# Patient Record
Sex: FEMALE | Race: WHITE | NOT HISPANIC OR LATINO | Employment: OTHER | URBAN - METROPOLITAN AREA
[De-identification: names, ages, dates, MRNs, and addresses within clinical notes are randomized per-mention and may not be internally consistent; named-entity substitution may affect disease eponyms.]

---

## 2018-01-15 NOTE — MISCELLANEOUS
Message   Recorded as Task   Date: 11/07/2016 10:36 AM, Created By: Shannon Reeves   Task Name: Miscellaneous   Assigned To: Shannon Reeves   Regarding Patient: Nadeen Villegas, Status: Active   CommentElmer Monahan - 07 Nov 2016 10:36 AM     TASK CREATED  Pt LMOM to cx appt for 11/23/16 as she is doing well! Andriy Nina - 07 Nov 2016 10:57 AM     TASK REPLIED TO: Previously Assigned To Andriy Nina  Provider aware  Thank you  Active Problems    1  Post herpetic neuralgia (053 19) (B02 29)   2  Thoracic neuralgia (729 2) (M79 2)    Current Meds   1  Colace Clear 50 MG Oral Capsule; TAKE AS DIRECTED; Therapy: [id number] to Recorded   2  Levothyroxine Sodium TABS; Therapy: (Recorded:14Hwj6539) to Recorded   3  Lyrica 50 MG Oral Capsule; 1 tab po tid; Therapy: [id number] to Recorded    Allergies    1  No Known Drug Allergies    Signatures   Electronically signed by :  Karoline Caba, ; Nov 7 2016 12:11PM EST                       (Author)

## 2018-01-15 NOTE — MISCELLANEOUS
Message   Recorded as Task   Date: 10/03/2016 11:41 AM, Created By: Cassia Villarreal   Task Name: Follow Up   Assigned To: [facility]   Regarding Patient: Chon Ramírez, Status: Active   Comment:    Latasha Carvajal - 03 Oct 2016 11:41 AM     TASK CREATED  Caller: Self; General Medical Question; [phone] (Home)  Pt presented at the window, s/w  staff  Requesting refill of lyrica to get to ov on 10/25  Latasha Carvajal - 03 Oct 2016 2:37 PM     TASK EDITED  LMOM to cb on home #   Wei,Latasha - 03 Oct 2016 3:29 PM     TASK EDITED  s/w pt  States she completed 1 week of 2 pills / day, and 1 week of 3 pills/ day  Pt states + relief, not completely gone, but feeling much better  No se's  Per pt, 42 tab on hand, next ov 10/25  Will need more medication to get to ov  Advised pt, will make Dr Breanne Talamantes aware and cb to advise tomorrow  Pt verbalized understanding and appreciation  [facility] [place] - 03 Oct 2016 9:19 PM     TASK REPLIED TO: Previously Assigned To Adelso Casper  please call in refill   Monse Robin - 05 Oct 2016 1:35 PM     TASK EDITED   Latasha Carvajal - 05 Oct 2016 1:50 PM     TASK EDITED   [facility] 10/5/2016 @ [phone]  Pt calling to fu to  of monday  Per SL, Lyrica 50 mg, 1 tab po tid #90/0 called into pharmacy per [employer]  Left a detaile dmessage on machine as per release of info on file advising of above  provided cb number for questions / concerns  Active Problems    1  Post herpetic neuralgia (053 19) (B02 29)   2  Thoracic neuralgia (729 2) (M79 2)    Current Meds   1  Levothyroxine Sodium TABS; Therapy: (Recorded:54Eoa4680) to Recorded   2  Tramadol-Acetaminophen 37 5-325 MG Oral Tablet; TAKE 1 TABLET 3 TIMES DAILY AS   NEEDED; Therapy: 22TGH1514 to (Evaluate:10Oct2016); Last Rx:20Sep2016 Ordered    Allergies    1   No Known Drug Allergies    Signatures   Electronically signed by : Riddhi Hoover, ; Oct  5 2016  1:50PM EST                       (Author)

## 2018-01-17 NOTE — MISCELLANEOUS
Message   Recorded as Task   Date: 09/27/2016 08:35 AM, Created By: [facility]   Task Name: Follow Up   Assigned To: [facility] [place] [facility]   Regarding Patient: Jose E Kim, Status: Active   Comment:    Latasha Carvajal - 27 Sep 2016 8:35 AM     TASK CREATED  Caller: Self; General Medical Question; [phone] (Home)  *** FYI ***  s/w pt, following up per Dr Sherry Bearden request  Pt states she has been taking lyrica 50 mg 1 tab po bid x 1 week w/ "Some" relief  Pt states, "I feel good " Denied se's  States she will begin Lyrica 50 mg 1 tab po tid today and fu w/ Dr Gracie Brown at her ov on 10/25  Advised pt to c/b w/ any questions / concern or if SE's present  Pt verbalized understanding and appreciation  Adelso Casper - 27 Sep 2016 9:22 AM     TASK REPLIED TO: Previously Assigned To [facility] [facility]  aware        Active Problems    1  Post herpetic neuralgia (053 19) (B02 29)   2  Thoracic neuralgia (729 2) (M79 2)    Current Meds   1  Levothyroxine Sodium TABS; Therapy: (Recorded:84Vhm1081) to Recorded   2  Tramadol-Acetaminophen 37 5-325 MG Oral Tablet; TAKE 1 TABLET 3 TIMES DAILY AS   NEEDED; Therapy: [id number] to (Evaluate:10Oct2016); Last Rx:[id number] Ordered    Allergies    1   No Known Drug Allergies    Signatures   Electronically signed by : Lindsey Barone, ; Sep 27 2016  9:24AM EST                       (Author)

## 2025-07-25 ENCOUNTER — OFFICE VISIT (OUTPATIENT)
Age: OVER 89
End: 2025-07-25
Payer: MEDICARE

## 2025-07-25 DIAGNOSIS — R26.81 GAIT INSTABILITY: ICD-10-CM

## 2025-07-25 DIAGNOSIS — S22.050D COMPRESSION FRACTURE OF T6 VERTEBRA WITH ROUTINE HEALING: ICD-10-CM

## 2025-07-25 DIAGNOSIS — H81.13 BENIGN PAROXYSMAL VERTIGO OF BOTH EARS: Primary | ICD-10-CM

## 2025-07-25 PROCEDURE — 97110 THERAPEUTIC EXERCISES: CPT

## 2025-07-25 PROCEDURE — 97112 NEUROMUSCULAR REEDUCATION: CPT

## 2025-07-25 NOTE — PROGRESS NOTES
Daily Note     Today's date: 2025  Patient name: Yina Bhardwaj  : 10/10/1932  MRN: [id number]  Referring provider: JAYCEE Zamorano  Encounter Diagnoses   Name Primary?    Benign paroxysmal vertigo of both ears Yes    Gait instability                   Subjective: Yina reports that she had a Dexa scan last week and was diagnosed with osteoporosis.  Would primarily like to work on her balance.       Objective: See treatment diary below      Assessment: Tolerated treatment well. Patient exhibited good technique with therapeutic exercises      Plan: Continue per plan of care.         Precautions: falls risk       Treatment Diary             Manual Therapy                                                                 Neuro Re-Ed             Airex EO/EC WBOS 30 sec             Airex EO/EC NBOS 30 sec            On airex head turns/nods 10 each             Firm surface staggered stance  30 sec             SLS 1 foot on aero level one 2x10 sec            Step over manjit forward/sideways 10 reps                         Ther Ex             Heel/toe raises  15 reps each            Hip abduction 10 each            Hip extension  10 each            Hip flexion 10  10 each             Mini squats  10             Sit to stand blue airex pad  10                                       Ther Activity                                       Gait Training                                       Modalities

## 2025-08-01 ENCOUNTER — OFFICE VISIT (OUTPATIENT)
Age: OVER 89
End: 2025-08-01
Payer: MEDICARE

## 2025-08-01 DIAGNOSIS — R26.81 GAIT INSTABILITY: ICD-10-CM

## 2025-08-01 DIAGNOSIS — H81.13 BENIGN PAROXYSMAL VERTIGO OF BOTH EARS: Primary | ICD-10-CM

## 2025-08-01 DIAGNOSIS — S22.050D COMPRESSION FRACTURE OF T6 VERTEBRA WITH ROUTINE HEALING: ICD-10-CM

## 2025-08-01 PROCEDURE — 97112 NEUROMUSCULAR REEDUCATION: CPT

## 2025-08-01 PROCEDURE — 97110 THERAPEUTIC EXERCISES: CPT

## 2025-08-01 PROCEDURE — 97530 THERAPEUTIC ACTIVITIES: CPT

## 2025-08-07 ENCOUNTER — OFFICE VISIT (OUTPATIENT)
Age: OVER 89
End: 2025-08-07
Payer: MEDICARE

## 2025-08-07 DIAGNOSIS — R26.81 GAIT INSTABILITY: Primary | ICD-10-CM

## 2025-08-07 DIAGNOSIS — H81.13 BENIGN PAROXYSMAL VERTIGO OF BOTH EARS: ICD-10-CM

## 2025-08-07 DIAGNOSIS — S22.050D COMPRESSION FRACTURE OF T6 VERTEBRA WITH ROUTINE HEALING: ICD-10-CM

## 2025-08-07 PROCEDURE — 97110 THERAPEUTIC EXERCISES: CPT

## 2025-08-07 PROCEDURE — 97530 THERAPEUTIC ACTIVITIES: CPT

## 2025-08-07 PROCEDURE — 97112 NEUROMUSCULAR REEDUCATION: CPT

## 2025-08-15 ENCOUNTER — OFFICE VISIT (OUTPATIENT)
Age: OVER 89
End: 2025-08-15
Payer: MEDICARE

## 2025-08-19 ENCOUNTER — OFFICE VISIT (OUTPATIENT)
Age: OVER 89
End: 2025-08-19
Payer: MEDICARE

## 2025-08-19 DIAGNOSIS — S22.050D COMPRESSION FRACTURE OF T6 VERTEBRA WITH ROUTINE HEALING: ICD-10-CM

## 2025-08-19 DIAGNOSIS — H81.13 BENIGN PAROXYSMAL VERTIGO OF BOTH EARS: ICD-10-CM

## 2025-08-19 DIAGNOSIS — R26.81 GAIT INSTABILITY: Primary | ICD-10-CM

## 2025-08-19 PROCEDURE — 97110 THERAPEUTIC EXERCISES: CPT

## 2025-08-19 PROCEDURE — 97530 THERAPEUTIC ACTIVITIES: CPT

## 2025-08-19 PROCEDURE — 97112 NEUROMUSCULAR REEDUCATION: CPT

## 2025-08-22 ENCOUNTER — OFFICE VISIT (OUTPATIENT)
Age: OVER 89
End: 2025-08-22
Payer: MEDICARE

## 2025-08-22 DIAGNOSIS — S22.050D COMPRESSION FRACTURE OF T6 VERTEBRA WITH ROUTINE HEALING: ICD-10-CM

## 2025-08-22 DIAGNOSIS — R26.81 GAIT INSTABILITY: Primary | ICD-10-CM

## 2025-08-22 DIAGNOSIS — H81.13 BENIGN PAROXYSMAL VERTIGO OF BOTH EARS: ICD-10-CM

## 2025-08-22 PROCEDURE — 97530 THERAPEUTIC ACTIVITIES: CPT

## 2025-08-22 PROCEDURE — 97110 THERAPEUTIC EXERCISES: CPT
